# Patient Record
Sex: MALE
[De-identification: names, ages, dates, MRNs, and addresses within clinical notes are randomized per-mention and may not be internally consistent; named-entity substitution may affect disease eponyms.]

---

## 2024-01-03 ENCOUNTER — APPOINTMENT (OUTPATIENT)
Dept: PULMONOLOGY | Facility: CLINIC | Age: 64
End: 2024-01-03
Payer: MEDICAID

## 2024-01-03 ENCOUNTER — NON-APPOINTMENT (OUTPATIENT)
Age: 64
End: 2024-01-03

## 2024-01-03 VITALS
OXYGEN SATURATION: 97 % | SYSTOLIC BLOOD PRESSURE: 121 MMHG | WEIGHT: 166 LBS | TEMPERATURE: 97.9 F | HEART RATE: 69 BPM | DIASTOLIC BLOOD PRESSURE: 79 MMHG | HEIGHT: 65 IN | BODY MASS INDEX: 27.66 KG/M2

## 2024-01-03 DIAGNOSIS — Z86.11 PERSONAL HISTORY OF TUBERCULOSIS: ICD-10-CM

## 2024-01-03 DIAGNOSIS — Z78.9 OTHER SPECIFIED HEALTH STATUS: ICD-10-CM

## 2024-01-03 DIAGNOSIS — J45.20 MILD INTERMITTENT ASTHMA, UNCOMPLICATED: ICD-10-CM

## 2024-01-03 DIAGNOSIS — Z87.09 PERSONAL HISTORY OF OTHER DISEASES OF THE RESPIRATORY SYSTEM: ICD-10-CM

## 2024-01-03 PROBLEM — Z00.00 ENCOUNTER FOR PREVENTIVE HEALTH EXAMINATION: Status: ACTIVE | Noted: 2024-01-03

## 2024-01-03 PROCEDURE — 99204 OFFICE O/P NEW MOD 45 MIN: CPT | Mod: 25

## 2024-01-03 PROCEDURE — 94010 BREATHING CAPACITY TEST: CPT

## 2024-01-03 RX ORDER — FLUTICASONE PROPIONATE AND SALMETEROL 250; 50 UG/1; UG/1
250-50 POWDER RESPIRATORY (INHALATION)
Qty: 3 | Refills: 3 | Status: ACTIVE | COMMUNITY
Start: 2024-01-03 | End: 1900-01-01

## 2024-01-03 RX ORDER — IPRATROPIUM BROMIDE 18 MCG
AEROSOL WITH ADAPTER (GRAM) INHALATION
Refills: 0 | Status: ACTIVE | COMMUNITY

## 2024-01-03 RX ORDER — ALBUTEROL SULFATE 90 UG/1
108 (90 BASE) INHALANT RESPIRATORY (INHALATION)
Qty: 2 | Refills: 3 | Status: ACTIVE | COMMUNITY
Start: 2024-01-03 | End: 1900-01-01

## 2024-01-03 NOTE — HISTORY OF PRESENT ILLNESS
[TextBox_4] : 64 [place] man [place], here for 4 months comes with his daughter who translates Referred by [facility] in [facility] of tuberculosis 20 years ago treated for over a year with multiple medicatins in the [place] No records available Recent CXR at [place] shows chronic appearing pleural parenchymal calcification in left hemithorax with volume loss in left lung He has NO cough, no sputum, no constitutional symptoms or anything to suggest active tuberculosis  He is also referred for evaluation of history of asthma, over the past year He appears to be treated with atrovent MDI which he says he takes twice a day--this medication is from the [place] He has minimal dyspnea on exertion No other complaints  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

## 2024-01-03 NOTE — ASSESSMENT
[FreeTextEntry1] : 62 yo man from , here for 4 months comes with his daughter who translates Referred by [facility]  Hx of tuberculosis 20 years ago treated for over a year with multiple medicatins in the [place] No records available Recent CXR at [place] shows chronic appearing pleural parenchymal calcification in left hemithorax with volume loss in left lung He has NO cough, no sputum, no constitutional symptoms or anything to suggest active tuberculosis  He is also referred for evaluation of history of asthma, over the past year He appears to be treated with atrovent MDI which he says he takes twice a day--this medication is from the [place] He has minimal dyspnea on exertion No other complaints  Denies HT DM CAD knowledge' Denies other medications Denies medicine allergies   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 relation of exposure to welding gases Recommend begin Advair and albuterol PRN May discontinue atrovent at this time- tho may be used in the future Will assess response of patient to meds Daughter notes that he is quite stoic tho

## 2024-01-03 NOTE — PROCEDURE
[FreeTextEntry1] : Jeff- Moderate obstructive disease FVC 2.68 89% FEV1 1.48 55% FEV1% 55% Midexpir flow 19%

## 2025-02-17 ENCOUNTER — RX RENEWAL (OUTPATIENT)
Age: 65
End: 2025-02-17